# Patient Record
Sex: FEMALE | Race: WHITE
[De-identification: names, ages, dates, MRNs, and addresses within clinical notes are randomized per-mention and may not be internally consistent; named-entity substitution may affect disease eponyms.]

---

## 2017-01-12 NOTE — EMERGENCY ROOM VISIT NOTE
History


Report prepared by Josy:  Linsey De La Cruz


Under the Supervision of:  Dr. José Miguel Mack M.D.


First contact with patient:  01:39


Chief Complaint:  PALPITATIONS


Stated Complaint:  HEART PALPITATIONS





History of Present Illness


The patient is a 31 year old female who presents to the Emergency Room with 

complaints of intermittent tachycardia and palpitations that began this 

evening.  The patient states that she felt fine earlier today and states that 

this evening, after she took her evening medications, she was watching TV when 

she developed the palpitations and tachycardia.  She states that she has had 

this happen in the past, noting that the first time she was in SVT and the 

second time her potassium level was off.  The patient states that when she 

noticed the symptoms today she associates a sore throat and cough, which is 

typical.  She denies any chest pain, lymphadenopathy, or shortness of breath.  

The patient denies any history of thyroid problems or blood clots.  She denies 

any recent travel or recent surgery.  She states that she takes birth control 

daily.





   Source of History:  patient


   Onset:  this evening


   Position:  other (global)


   Quality:  other (tachycardia and palpitations)


   Timing:  intermittent


   Associated Symptoms:  + cough, + sorethroat, No SOB, No chest pain, No 

lymphadenopathy





Review of Systems


See HPI for pertinent positives & negatives. A total of 10 systems reviewed and 

were otherwise negative.





Past Medical & Surgical


Medical Problems:


(1) UTI (urinary tract infection)


Social History Problems:


(1) IUD (intrauterine device) in place








Family History





Patient reports no known family medical history.





Social History


Smoking Status:  Never Smoker


Alcohol Use:  occasionally


Drug Use:  none


Occupation Status:  employed





Current/Historical Medications


Scheduled


Medroxyprogesterone Acetate (Depo-Provera), 400 MG INJ [id number]


Metoprolol Tartrate (Lopressor) (Lopressor), 25 MG PO BID





Scheduled PRN


Cyclobenzaprine Hcl (Flexeril), 10 MG PO BID PRN for SPASMS


Fluticasone Propionate (Nasal) (Flonase Allergy Relief), 1 SPRAY SHANITA DAILY PRN 

for CONGESTION


Loratadine (Claritin), 10 MG PO DAILY PRN for ALLERGIC REACTION


Lorazepam (Ativan), 1 MG PO BID PRN for Anxiety


Tramadol (Ultram), 50 MG PO Q6H PRN for Pain





Allergies


Coded Allergies:  


     Cephalexin (Verified  Allergy, Unknown, hives, 2/22/15)


     Prednisone (Verified  Allergy, Unknown, SVT, 6/27/16)





Physical Exam


Vital Signs











  Date Time  Temp Pulse Resp B/P Pulse Ox O2 Delivery O2 Flow Rate FiO2


 


1/12/17 02:53  84 20 116/73 96   


 


1/12/17 02:06  81      


 


1/12/17 01:53  91 20 120/75 98 Room Air  


 


1/12/17 01:45      Room Air  


 


1/12/17 01:34 37.0 85 20 182/115 97 Room Air  











Physical Exam


GENERAL: Patient is well appearing and in no acute distress.


HEENT: No acute trauma, normocephalic atraumatic, mucous membranes moist, no 

nasal congestion, no scleral icterus.


NECK: No stridor, no adenopathy, no meningismus, trachea is midline.


LUNGS: No dyspnea. Clear to auscultation and equal bilaterally. No wheeze, no 

rhonchi.


HEART: Regular rate and rhythm.  No murmurs, rubs, gallops appreciated.


ABDOMEN: Soft, nontender, bowel sounds positive, no masses appreciated, no 

peritonitis.


BACK: No midline tenderness, no CVA tenderness


EXTREMITIES: Normal motion all extremities, no cyanosis, no edema.


NEUROLOGIC: Alert and oriented, no acute motor or sensory deficits, no focal 

weakness, cranial nerves grossly intact.


SKIN: No rash, no jaundice, no diaphoresis.





Medical Decision & Procedures


Laboratory Results


1/12/17 01:48








Red Blood Count 5.05, Mean Corpuscular Volume 82.6, Mean Corpuscular Hemoglobin 

29.5, Mean Corpuscular Hemoglobin Concent 35.7, Mean Platelet Volume 9.0, 

Neutrophils (%) (Auto) 52.9, Lymphocytes (%) (Auto) 35.3, Monocytes (%) (Auto) 

8.6, Eosinophils (%) (Auto) 2.4, Basophils (%) (Auto) 0.4, Neutrophils # (Auto) 

7.05, Lymphocytes # (Auto) 4.72, Monocytes # (Auto) 1.15, Eosinophils # (Auto) 

0.32, Basophils # (Auto) 0.06





1/12/17 01:48

















Test


  1/12/17


01:48


 


White Blood Count


  13.36 K/uL


(4.8-10.8)


 


Red Blood Count


  5.05 M/uL


(4.2-5.4)


 


Hemoglobin


  14.9 g/dL


(12.0-16.0)


 


Hematocrit 41.7 % (37-47) 


 


Mean Corpuscular Volume


  82.6 fL


()


 


Mean Corpuscular Hemoglobin


  29.5 pg


(25-34)


 


Mean Corpuscular Hemoglobin


Concent 35.7 g/dl


(32-36)


 


Platelet Count


  410 K/uL


(130-400)


 


Mean Platelet Volume


  9.0 fL


(7.4-10.4)


 


Neutrophils (%) (Auto) 52.9 % 


 


Lymphocytes (%) (Auto) 35.3 % 


 


Monocytes (%) (Auto) 8.6 % 


 


Eosinophils (%) (Auto) 2.4 % 


 


Basophils (%) (Auto) 0.4 % 


 


Neutrophils # (Auto)


  7.05 K/uL


(1.4-6.5)


 


Lymphocytes # (Auto)


  4.72 K/uL


(1.2-3.4)


 


Monocytes # (Auto)


  1.15 K/uL


(0.11-0.59)


 


Eosinophils # (Auto)


  0.32 K/uL


(0-0.5)


 


Basophils # (Auto)


  0.06 K/uL


(0-0.2)


 


RDW Standard Deviation


  40.7 fL


(36.4-46.3)


 


RDW Coefficient of Variation


  13.6 %


(11.5-14.5)


 


Immature Granulocyte % (Auto) 0.4 % 


 


Immature Granulocyte # (Auto)


  0.06 K/uL


(0.00-0.02)


 


Anion Gap


  12.0 mmol/L


(3-11)


 


Est Creatinine Clear Calc


Drug Dose 157.4 ml/min 


 


 


Estimated GFR (


American) 124.0 


 


 


Estimated GFR (Non-


American 107.0 


 


 


BUN/Creatinine Ratio 15.7 (10-20) 


 


Calcium Level


  8.9 mg/dl


(8.5-10.1)


 


Troponin I


  < 0.015 ng/ml


(0-0.045)


 


Thyroid Stimulating Hormone


(TSH) 3.420 uIu/ml


(0.300-4.500)








Laboratory results as reviewed by me.





ECG


Indication:  palpitations, tachycardia


Rate (beats per minute):  86


Rhythm:  normal sinus


Findings:  PVC, no acute ischemic change





ED Course


0140: The patient was evaluated in room B2. A complete history and physical 

exam was performed.





0237: I reevaluated the patient and she is feeling well with no further issues.





0247: I reevaluated the patient and she is doing well.  I discussed the exam 

findings with her and I discussed the treatment plan.  She verbalized complete 

understanding and agreement.  She is ready to go home.





Medical Decision


Differential: NSR, SVT, PACs, PVCs, Cardiac Dysrhythmia, Endocrine Dysfunction, 

Electrolyte/Metabolic Abnormality, Pulmonary Embolism, Infectious, GI, amongst 

other pathologies entertained.





31 yr old female with several year history of periodic palpitations, usually 

brought on by caffeine or stress.  Admits poor appetite with increased stress/

work over last 24 hours and then stated having palpitations this evening.  

Notes associated cough which is typical for when she has these palpitations.  

No PE risk factors and she is without any symptoms currently.  States symptoms 

similar to previous and thus I feel PE work-up not indicated at this time.  

Notes previously normal TSH/Mag when checked.  Follows regularly with cards.  

BP and HR good here.  We discussed she could take extra metoprolol in future if 

palpitations and no other symptoms.  She is comfortable with going home and in 

no distress.  Periodic PVCs on monitor noted without runs or issue.





Impression





 Primary Impression:  


 Intermittent palpitations





Scribe Attestation


The scribe's documentation has been prepared under my direction and personally 

reviewed by me in its entirety. I confirm that the note above accurately 

reflects all work, treatment, procedures, and medical decision making performed 

by me.





Departure Information


Dispostion


Home / Self-Care





Referrals


Donald Viveros M.D. (PCP)





Forms


HOME CARE DOCUMENTATION FORM,                                                 

               IMPORTANT VISIT INFORMATION





Patient Instructions


A Signature Page, ED Palpitations, My [facility]

## 2017-06-26 NOTE — EMERGENCY ROOM VISIT NOTE
History


First contact with patient:  04:00


Chief Complaint:  HEAD INJURY (MINOR)


Stated Complaint:  CONCUSSION X 1WK AGO





History of Present Illness


The patient is a 31 year old female who presents to the Emergency Room with 

complaints of head injury one week ago.  Patient states she stepped up and her 

jeep and hit her head on the door.  She complained of immediate headache with 

feeling lightheaded and dizzy.  Patient saw her family care for this was 

diagnosed with concussion.  Patient states this is her first night back to work 

and feels that this if she overdid it.  Patient states she did a lot of house 

cleaning today before coming into work.  She has not been really not much since 

the initial head injury.  She developed headache and vomited tonight after 

reading a lot of charts and documenting.  She currently complains of a headache 

and feels lightheaded.  5 out of 10 throughout the head.  Nothing makes it 

better or worse.  Patient with occasional dizziness with feeling off balance.  

Patient denies chest pain, dyspnea, loss of vision, blurry vision, neck pain, 

chest pain, abdominal pain, numbness, tingling.  She is tolerate by mouth 

fluids and food.





Review of Systems


See HPI for pertinent positives & negatives. A total of 10 systems reviewed and 

were otherwise negative.





Past Medical/Surgical History


Medical Problems:


(1) UTI (urinary tract infection)


Social History Problems:


(1) IUD (intrauterine device) in place








Family History





Patient reports no known family medical history.





Social History


Smoking Status:  Never Smoker


Alcohol Use:  occasionally


Drug Use:  none


Occupation Status:  employed





Current/Historical Medications


Scheduled


Medroxyprogesterone Acetate (Depo-Provera), 400 MG INJ [id number]


Metoprolol Tartrate (Lopressor) (Lopressor), 25 MG PO BID


Ondasetron Odt (Zofran Odt), 4 MG SL Q6H





Scheduled PRN


Cyclobenzaprine Hcl (Flexeril), 10 MG PO BID PRN for SPASMS


Fluticasone Propionate (Nasal) (Flonase Allergy Relief), 1 SPRAY SHANITA DAILY PRN 

for CONGESTION


Loratadine (Claritin), 10 MG PO DAILY PRN for ALLERGIC REACTION


Lorazepam (Ativan), 1 MG PO BID PRN for Anxiety


Tramadol (Ultram), 50 MG PO Q6H PRN for Pain





Allergies


Coded Allergies:  


     Cephalexin (Verified  Allergy, Unknown, hives, 2/22/15)


     Prednisone (Verified  Allergy, Unknown, SVT, 16)





Physical Exam


Vital Signs











  Date Time  Temp Pulse Resp B/P (MAP) Pulse Ox O2 Delivery O2 Flow Rate FiO2


 


17 04:01   18     


 


17 03:54 36.9 81 20 138/92 96 Room Air  











Physical Exam


VITALS: Vitals are noted on the nurse's note and reviewed by myself.  Vital 

signs stable.


GENERAL: Pleasant female, in no acute distress, nondiaphoretic, well-developed 

well-nourished.


SKIN: The skin was without rashes, erythema, edema, or bruising.  There is no 

tenting of the skin.  Capillary reflex less than 2 seconds.


HEAD: Normocephalic atraumatic.  


EARS: External auditory canals clear, tympanic membranes pearly gray without 

erythema or effusion bilaterally.


EYES: Pupils equal round and reactive to light and accommodation.  Conjunctivae 

without injection, sclerae without icterus.  Extraocular movements intact.  


NOSE: Patent, turbinates without inflammation or discharge.  No sinus 

tenderness.


MOUTH: Mucous membranes moist.   Pharynx without erythema or exudate.  Uvula 

midline.  Airway patent.  Tongue does not deviate.  


NECK: Supple without nuchal rigidity.  No lymphadenopathy.  No thyromegaly.  

Cervical spine is nontender.  No JVD.


HEART: Regular rate and rhythm without murmurs gallops or rubs.


LUNGS: Clear to auscultation bilaterally without wheezes, rales or rhonchi.  No 

dullness to percussion.  No retractions or accessory muscle use.


ABDOMEN: Positive bowel sounds x 4.  Normal tympanic percussion.  Soft, 

nontender, without masses or organomegaly.  Leavitt sign negative.  No guarding 

or rebound tenderness.


MUSCULOSKELETAL: No muscle atrophy, erythema, or edema noted.   


NEURO: Patient was alert and oriented to person place and time.  Normal 

sensation to light and sharp touch.  No focal neurological deficits.  Cranial 

nerves II through XII grossly intact.  No pronator drift.  Cerebellar exam 

intact





Medical Decision & Procedures


ED Course


Prior records/ancillary studies reviewed.


Triage Nursing notes reviewed.





The patient's history was concerning for traumatic head injury





Differential diagnosis:





Etiologies such as concussion, contusion, fracture, subdural hematoma, epidural 

hematoma, intraparenchymal hemorrhage, as well as other traumatic pathologies 

were entertained.





Physical examination findings:


As above.





ER treatment provided:


Zofran ODT


On reassessment the patient felt better.





Diagnostics interpreted by me:


Deferred 





It appears the patient has a concussion. I discussed the risks and the benefits 

of CT scanning. Clinically the patient is doing well and does not appear to 

have a significant underlying injury. The pt felt comfortable with conservative 

observation with the understanding if the clinical picture change that imaging 

may be necessary at a later time.  I gave my usual and customary discussion 

regarding this issue.  Patient was advised follow-up concussion clinic here in 

[facility].  She is advised to avoid activities that cause her headaches and to take 

a break from activity when she develops one.  She is advised to see her family 

care doctor in a few days and take Zofran and Tylenol as needed for her 

symptoms.  She is advised to return to the ER immediately for severe headache, 

fevers, weakness, worsening signs or symptoms or as needed.  Patient was 

neurovascularly and neurologically intact.  She is well-appearing.





By the evaluation outlined above emergent etiologies such as fracture, subdural 

hematoma, epidural hematoma, intraparenchymal hemorrhage, as well as others 

were deemed relatively unlikely.





The pt informed about the findings as listed above. All questions were answered 

and  pleased with the treatment. Return instructions were outlined and the 

patient was discharged in stable condition.





Outpatient Prescription Management:


zofran





Referral:


The patient was referred back to their primary care physician for follow-up in 

2 to 3 days for a recheck of the current condition.





Medical Decision


As above





Impression





 Primary Impression:  


 Closed head injury





Departure Information


Dispostion


Home / Self-Care





Condition


GOOD





Prescriptions





Ondasetron Odt (ZOFRAN ODT) 4 Mg Tab


4 MG SL Q6H, #10 TAB


   Prov: Jasmyn Siu .CEFERINO         17





Forms


HOME CARE DOCUMENTATION FORM,                                                 

               Work Instructions,    Return To Work:  3 days


      


   IMPORTANT VISIT INFORMATION





Patient Instructions


Concussion, My [facility]





Additional Instructions





Zofran 4 m tablet every 6 hours as needed for nausea and her vomiting.





Read head injury handout and return for any symptoms.  Tylenol 1000 mg as 

needed for pain (Maximum 3000 mg Tylenol in 24 hr period). Avoid alcohol and 

contact sports/activities for one week and follow up with family doctor prior 

to returning to these activities if still symptomatic. Ice and elevate head.





If your symptoms persist more than a week then follow up with the concussion 

clinic. Call [phone].





Return to ER sooner for headache, fevers, confusion, worsening signs or 

symptoms or as needed.





Follow-up with family care in 2-3 days for further evaluation.





Work Instructions


Return To Work:  3 days





Problem Qualifiers








 Primary Impression:  


 Closed head injury


 Encounter type:  initial encounter  Qualified Codes:  S09.90XA - Unspecified 

injury of head, initial encounter

## 2017-12-10 ENCOUNTER — HOSPITAL ENCOUNTER (EMERGENCY)
Dept: HOSPITAL 45 - C.EDB | Age: 32
Discharge: HOME | End: 2017-12-10
Payer: COMMERCIAL

## 2017-12-10 VITALS — DIASTOLIC BLOOD PRESSURE: 88 MMHG | OXYGEN SATURATION: 98 % | HEART RATE: 93 BPM | SYSTOLIC BLOOD PRESSURE: 131 MMHG

## 2017-12-10 VITALS
WEIGHT: 293 LBS | HEIGHT: 67.99 IN | HEIGHT: 67.99 IN | WEIGHT: 293 LBS | BODY MASS INDEX: 44.41 KG/M2 | BODY MASS INDEX: 44.41 KG/M2

## 2017-12-10 VITALS — TEMPERATURE: 98.42 F

## 2017-12-10 DIAGNOSIS — R05: ICD-10-CM

## 2017-12-10 DIAGNOSIS — J06.9: Primary | ICD-10-CM

## 2017-12-10 NOTE — EMERGENCY ROOM VISIT NOTE
History


Report prepared by Josy:  Omar Simpson


Under the Supervision of:  Dr. Gorge Fraga D.O.


First contact with patient:  08:40


Chief Complaint:  COUGH


Stated Complaint:  UPPER RESPIRATORY INFECTION





History of Present Illness


The patient is a 32 year old female who presents to the Emergency Room with 

complaints of a cough that began 1 month ago. She has a past medical history of 

SVT and was previously on Lopressor. She follows up with Cardiology and was 

recently switched from Lopressor to Cardizem. Over this time, she has been 

intermittently coughing up thick yellow sputum with a feeling of chest 

congestion. She is also has a sore throat. She denies any other abnormal 

symptoms.





   Source of History:  patient


   Onset:  1 month


   Position:  other (Respiratory System)


   Symptom Intensity:  moderate


   Quality:  other (Cough)


   Timing:  constant


   Associated Symptoms:  + sorethroat


Note:


She is having chest congestion. She denies any other abnormal symptoms.





Review of Systems


See HPI for pertinent positives & negatives. A total of 10 systems reviewed and 

were otherwise negative.





Past Medical & Surgical


Medical Problems:


(1) UTI (urinary tract infection)


Social History Problems:


(1) IUD (intrauterine device) in place








Family History





Patient reports no known family medical history.





Social History


Smoking Status:  Never Smoker


Alcohol Use:  occasionally


Drug Use:  none


Occupation Status:  employed





Current/Historical Medications


Scheduled


Diltiazem Hcl Coated Beads (Cartia Xt), 120 MG PO DAILY


Escitalopram Oxalate (Escitalopram Oxalate), 10 MG PO DAILY


Medroxyprogesterone Acetate (Depo-Provera), 400 MG INJ [id number]


Ondasetron Odt (Zofran Odt), 4 MG SL Q6H





Scheduled PRN


Cyclobenzaprine Hcl (Flexeril), 10 MG PO BID PRN for SPASMS


Fluticasone Propionate (Nasal) (Flonase Allergy Relief), 1 SPRAY SHANITA DAILY PRN 

for CONGESTION


Loratadine (Claritin), 10 MG PO DAILY PRN for ALLERGIC REACTION


Lorazepam (Ativan), 1 MG PO BID PRN for Anxiety


Tramadol (Ultram), 50 MG PO Q6H PRN for Pain





Allergies


Coded Allergies:  


     Cephalexin (Verified  Allergy, Unknown, hives, 12/10/17)


     Prednisone (Verified  Allergy, Unknown, SVT, 12/10/17)





Physical Exam


Vital Signs











  Date Time  Temp Pulse Resp B/P (MAP) Pulse Ox O2 Delivery O2 Flow Rate FiO2


 


12/10/17 09:14      Room Air  


 


12/10/17 08:35 36.9 99 18 129/91 96 Room Air  











Physical Exam


CONSTITUTIONAL/VITAL SIGNS: Reviewed / noted above.


GENERAL: Non-toxic in appearance. 


INTEGUMENTARY: Warm, dry, and Pink.


HEAD: Normocephalic.


EYES: without scleral icterus or trauma.


ENT/OROPHARYNX: Moist. There are numerous tiny posterior pharyngeal vesicles. 


LYMPHADENOPATHY/NECK: Is supple without lymphadenopathy or meningismus.


RESPIRATORY: Lungs clear and equal.


CARDIOVASCULAR: Regular rate and rhythm.


GI/ABDOMEN: Soft and nontender. No organomegaly or pulsatile mass. No rebound 

or guarding. Normal bowel sounds.


EXTREMITIES: Warm and well perfused.


BACK: No CVA tenderness.


NEUROLOGICAL: Intact without focal deficits. 


PSYCHIATRIC: normal affect.


MUSCULOSKELETAL: Normally developed with good muscle tone.





Medical Decision & Procedures


ER Provider


Diagnostic Interpretation:


Radiology results as stated below per my review and radiologist interpretation:





CHEST ONE VIEW PORTABLE





HISTORY:  32 years-old Female cough acute cough





COMPARISON: Chest radiograph 2/23/2015





TECHNIQUE: Portable AP view of the chest





FINDINGS: 


Cardiomediastinal and hilar silhouettes are within normal limits. No


pneumothorax, pleural effusion, focal airspace consolidation or overt pulmonary


edema. Bones of the chest are grossly intact.





IMPRESSION: No acute cardiopulmonary process. 





The above report was generated using voice recognition software. It may contain


grammatical, syntax or spelling errors.





Electronically signed by:  Yong Castaneda M.D.


12/10/2017 9:20 AM





Dictated Date/Time:  12/10/2017 9:15 AM





Medications Administered











 Medications


  (Trade)  Dose


 Ordered  Sig/Susan


 Route  Start Time


 Stop Time Status Last Admin


Dose Admin


 


 Albuterol/


 Ipratropium


  (Duoneb)  3 ml  NOW  STAT


 INH  12/10/17 08:51


 12/10/17 08:57 DC 12/10/17 09:11


3 ML


 


 Dexamethasone


 Sodium Phosphate


  (Dexamethasone


 Inj **Pf**)  10 mg  STK-MED ONCE


 .ROUTE  12/10/17 09:09


 12/10/17 09:10 DC 12/10/17 09:13


8 MG











ED Course


0840: Previous medical records were reviewed. The patient was evaluated in room 

B2. A complete history and physical examination was performed.





0851: Ordered Decadron Inj 8 mg IM, DuoNeb 3 ml INH





0909: Ordered Dexamethasone Sodium Phosphate 10 mg .ROUTE





1000: On reevaluation, the patient is resting. I discussed the results and 

findings with the patient. She verbalized agreement of the treatment plan. She 

was discharged home.





Medical Decision


Differentials considered include acute myocardial infarction, acute coronary 

syndrome, myocarditis, pericarditis, pericardial effusions /tamponade, 

esophageal perforation, pulmonary embolism, pneumonia, pneumothorax, 

cardiomyopathy, congestive heart, anemia, and COPD/asthma exacerbation.





This is a 32-year-old female who presents to the ED with a chief complaint of a 

cough she reports that her symptoms have increased over the last couple of 

days.  She states that she has an occasional productive yellow sputum.  She 

also complains of a sore throat.  Her vital signs are normal.  She is afebrile.

  Physical exam reveals some posterior oropharyngeal vesicles.  She is no 

lymphadenopathy.  Her lungs were mostly clear.  She was treated with a DuoNeb 

treatment as well as some IM Decadron.  Her symptoms are felt to be viral.  A 

chest x-ray did not show acute process.  She is felt to be stable for discharge.





Medication Reconcilliation


Current Medication List:  was personally reviewed by me





Blood Pressure Screening


Patient's blood pressure:  Normal blood pressure


Blood pressure disposition:  Did not require urgent referral





Impression





 Primary Impression:  


 Viral URI with cough





Scribe Attestation


The scribe's documentation has been prepared under my direction and personally 

reviewed by me in its entirety. I confirm that the note above accurately 

reflects all work, treatment, procedures, and medical decision making performed 

by me.





Departure Information


Dispostion


Home / Self-Care





Referrals


Donald Viveros M.D. (PCP)





Forms


HOME CARE DOCUMENTATION FORM,                                                 

               IMPORTANT VISIT INFORMATION





Patient Instructions


My [facility]





Additional Instructions





Follow-up with your PCP in 4-7 days if symptoms persist.





Anticipate some improvement of the symptoms over the next week.





Take Tylenol or Motrin as needed for discomfort.





Use throat lozenges or sore throat sprays for sore throat.

## 2018-02-21 ENCOUNTER — HOSPITAL ENCOUNTER (EMERGENCY)
Dept: HOSPITAL 45 - C.EDB | Age: 33
Discharge: HOME | End: 2018-02-21
Payer: COMMERCIAL

## 2018-02-21 VITALS
WEIGHT: 293 LBS | HEIGHT: 67.99 IN | BODY MASS INDEX: 44.41 KG/M2 | HEIGHT: 67.99 IN | WEIGHT: 293 LBS | BODY MASS INDEX: 44.41 KG/M2

## 2018-02-21 VITALS — TEMPERATURE: 98.6 F

## 2018-02-21 VITALS — OXYGEN SATURATION: 95 % | SYSTOLIC BLOOD PRESSURE: 109 MMHG | HEART RATE: 108 BPM | DIASTOLIC BLOOD PRESSURE: 77 MMHG

## 2018-02-21 VITALS — OXYGEN SATURATION: 94 %

## 2018-02-21 DIAGNOSIS — Z87.440: ICD-10-CM

## 2018-02-21 DIAGNOSIS — F39: Primary | ICD-10-CM

## 2018-02-21 DIAGNOSIS — Z79.899: ICD-10-CM

## 2018-02-21 LAB
ALBUMIN SERPL-MCNC: 3.8 GM/DL (ref 3.4–5)
ALP SERPL-CCNC: 98 U/L (ref 45–117)
ALT SERPL-CCNC: 24 U/L (ref 12–78)
AST SERPL-CCNC: 13 U/L (ref 15–37)
BASOPHILS # BLD: 0.07 K/UL (ref 0–0.2)
BASOPHILS NFR BLD: 0.6 %
BUN SERPL-MCNC: 10 MG/DL (ref 7–18)
CALCIUM SERPL-MCNC: 9 MG/DL (ref 8.5–10.1)
CO2 SERPL-SCNC: 22 MMOL/L (ref 21–32)
CREAT SERPL-MCNC: 0.84 MG/DL (ref 0.6–1.2)
EOS ABS #: 0.14 K/UL (ref 0–0.5)
EOSINOPHIL NFR BLD AUTO: 395 K/UL (ref 130–400)
GLUCOSE SERPL-MCNC: 89 MG/DL (ref 70–99)
HCT VFR BLD CALC: 42.6 % (ref 37–47)
HGB BLD-MCNC: 15.2 G/DL (ref 12–16)
IG#: 0.05 K/UL (ref 0–0.02)
IMM GRANULOCYTES NFR BLD AUTO: 33.9 %
INR PPP: 1 (ref 0.9–1.1)
LIPASE: 151 U/L (ref 73–393)
LYMPHOCYTES # BLD: 3.85 K/UL (ref 1.2–3.4)
MCH RBC QN AUTO: 29.2 PG (ref 25–34)
MCHC RBC AUTO-ENTMCNC: 35.7 G/DL (ref 32–36)
MCV RBC AUTO: 81.8 FL (ref 80–100)
MONO ABS #: 0.79 K/UL (ref 0.11–0.59)
MONOCYTES NFR BLD: 7 %
NEUT ABS #: 6.45 K/UL (ref 1.4–6.5)
NEUTROPHILS # BLD AUTO: 1.2 %
NEUTROPHILS NFR BLD AUTO: 56.9 %
PMV BLD AUTO: 8.6 FL (ref 7.4–10.4)
POTASSIUM SERPL-SCNC: 3.5 MMOL/L (ref 3.5–5.1)
PROT SERPL-MCNC: 8.5 GM/DL (ref 6.4–8.2)
PTT PATIENT: 27.5 SECONDS (ref 21–31)
RED CELL DISTRIBUTION WIDTH CV: 13.7 % (ref 11.5–14.5)
RED CELL DISTRIBUTION WIDTH SD: 41.1 FL (ref 36.4–46.3)
SODIUM SERPL-SCNC: 140 MMOL/L (ref 136–145)
WBC # BLD AUTO: 11.35 K/UL (ref 4.8–10.8)

## 2018-05-09 ENCOUNTER — HOSPITAL ENCOUNTER (EMERGENCY)
Dept: HOSPITAL 45 - C.EDB | Age: 33
Discharge: HOME | End: 2018-05-09
Payer: COMMERCIAL

## 2018-05-09 VITALS — SYSTOLIC BLOOD PRESSURE: 114 MMHG | DIASTOLIC BLOOD PRESSURE: 76 MMHG

## 2018-05-09 VITALS — TEMPERATURE: 98.42 F

## 2018-05-09 VITALS — OXYGEN SATURATION: 95 % | HEART RATE: 89 BPM

## 2018-05-09 VITALS
BODY MASS INDEX: 44.41 KG/M2 | HEIGHT: 67.99 IN | HEIGHT: 67.99 IN | BODY MASS INDEX: 44.41 KG/M2 | WEIGHT: 293 LBS | WEIGHT: 293 LBS

## 2018-05-09 DIAGNOSIS — Z97.5: ICD-10-CM

## 2018-05-09 DIAGNOSIS — Z79.899: ICD-10-CM

## 2018-05-09 DIAGNOSIS — R09.81: ICD-10-CM

## 2018-05-09 DIAGNOSIS — E86.0: ICD-10-CM

## 2018-05-09 DIAGNOSIS — J02.9: Primary | ICD-10-CM

## 2018-05-09 DIAGNOSIS — Z88.8: ICD-10-CM

## 2018-05-09 DIAGNOSIS — Z87.440: ICD-10-CM

## 2018-05-09 DIAGNOSIS — Z88.1: ICD-10-CM

## 2018-05-09 LAB
BASOPHILS # BLD: 0.05 K/UL (ref 0–0.2)
BASOPHILS NFR BLD: 0.5 %
BUN SERPL-MCNC: 11 MG/DL (ref 7–18)
CALCIUM SERPL-MCNC: 8.6 MG/DL (ref 8.5–10.1)
CO2 SERPL-SCNC: 24 MMOL/L (ref 21–32)
CREAT SERPL-MCNC: 0.88 MG/DL (ref 0.6–1.2)
EOS ABS #: 0.35 K/UL (ref 0–0.5)
EOSINOPHIL NFR BLD AUTO: 350 K/UL (ref 130–400)
GLUCOSE SERPL-MCNC: 91 MG/DL (ref 70–99)
HCT VFR BLD CALC: 42 % (ref 37–47)
HGB BLD-MCNC: 14.3 G/DL (ref 12–16)
IG#: 0.07 K/UL (ref 0–0.02)
IMM GRANULOCYTES NFR BLD AUTO: 40.5 %
LYMPHOCYTES # BLD: 3.9 K/UL (ref 1.2–3.4)
MCH RBC QN AUTO: 28.4 PG (ref 25–34)
MCHC RBC AUTO-ENTMCNC: 34 G/DL (ref 32–36)
MCV RBC AUTO: 83.5 FL (ref 80–100)
MONO ABS #: 0.76 K/UL (ref 0.11–0.59)
MONOCYTES NFR BLD: 7.9 %
MONOSPOT: (no result)
NEUT ABS #: 4.51 K/UL (ref 1.4–6.5)
NEUTROPHILS # BLD AUTO: 3.6 %
NEUTROPHILS NFR BLD AUTO: 46.8 %
PMV BLD AUTO: 8.6 FL (ref 7.4–10.4)
POTASSIUM SERPL-SCNC: 3.5 MMOL/L (ref 3.5–5.1)
RED CELL DISTRIBUTION WIDTH CV: 14.6 % (ref 11.5–14.5)
RED CELL DISTRIBUTION WIDTH SD: 44.4 FL (ref 36.4–46.3)
SODIUM SERPL-SCNC: 140 MMOL/L (ref 136–145)
WBC # BLD AUTO: 9.64 K/UL (ref 4.8–10.8)

## 2018-05-09 NOTE — DIAGNOSTIC IMAGING REPORT
CHEST ONE VIEW PORTABLE



CLINICAL HISTORY: Wheeze dyspnea



COMPARISON STUDY:  12/10/2017



FINDINGS: The bones soft tissues and hemidiaphragms are normal. The

cardiomediastinal silhouette is normal. The lungs are clear. The pulmonary

vasculature is normal. 



IMPRESSION:  Negative chest. 











The above report was generated using voice recognition software.  It may contain

grammatical, syntax or spelling errors.









Electronically signed by:  José Miguel Jacobs M.D.

5/9/2018 6:34 AM



Dictated Date/Time:  5/9/2018 6:34 AM

## 2018-05-09 NOTE — EMERGENCY ROOM VISIT NOTE
History


Report prepared by Josy:  Tracy Navarro


Under the Supervision of:  Dr. José Miguel Mack M.D.


First contact with patient:  02:10


Chief Complaint:  SORETHROAT


Stated Complaint:  SORE THROAT, DIZZY, COUGH





History of Present Illness


The patient is a 32 year old female who presents to the Emergency Room with 

complaints of a constant sore throat starting a month ago. The patient states 

that she thinks she has mono. She reports that she saw her PCP yesterday who 

thought the same thing and started her on a Z-pack. She states her PCP also 

noticed fluid in her ears. She reports that she was not started on a steroid, 

but thought maybe she should be. The patient complains of fatigue, nasal 

congestion, loss of appetite, fevers, a rash of little red bumps, loose stools 

since starting the Z-Pack, and feeling like her breathing is off. She notes 

that she has used Flonase, Rodger-vent, and Albuterol with no relief. She notes 

that she lives in an apartment with mold and that she is being followed for her 

breathing problem. The patient denies exposure to someone with mono, a history 

of asthma, abdominal pain, recent tick bites, ever having Lyme, urinary symptoms

, and the chance of pregnancy. The patient notes that she recently was on a 

cruise.





   Source of History:  patient


   Onset:  a month ago


   Position:  throat


   Quality:  other (sore)


   Timing:  constant


   Associated Symptoms:  + fevers, + diarrhea, + fatigue, + rash, No abdominal 

pain, No urinary symptoms


Note:


The patient complains of nasal congestion, loss of appetite, and feeling like 

her breathing is off. The patient denies recent tick bites.





Review of Systems


See HPI for pertinent positives & negatives. A total of 10 systems reviewed and 

were otherwise negative.





Past Medical & Surgical


Medical Problems:


(1) UTI (urinary tract infection)


Social History Problems:


(1) IUD (intrauterine device) in place








Family History





Patient reports no known family medical history.





Social History


Smoking Status:  Never Smoker


Alcohol Use:  occasionally


Drug Use:  none


Marital Status:  single


Housing Status:  lives alone


Occupation Status:  employed





Current/Historical Medications


Scheduled


Aripiprazole (Abilify), 5 MG PO DAILY


Diltiazem Hcl Coated Beads (Cartia Xt), 240 MG PO DAILY


Escitalopram Oxalate (Lexapro), 20 MG PO DAILY


Fluticasone Propionate (Flovent Hfa), 2 PUFFS INH BID


Medroxyprogesterone Acetate (C (Medroxyprogesterone Aceta), 150 MG IM UD


Methylprednisolone (Medrol Dosepak), 1 PKT PO UD


Montelukast Sod (Montelukast Sodium), 10 MG PO DAILY


Naltrexone HCl-Bupropion HCl (Contrave 8-90 mg), 1 TAB PO BID


Topiramate (Topamax ), 25 MG PO BID





Scheduled PRN


Albuterol Hfa (Ventolin Hfa), 2 PUFFS INH Q4H PRN for SOB/Wheezing


Fluticasone Propionate (Nasal) (Flonase Allergy Relief), 2 SPRAYS SHANITA DAILY PRN 

for Nasal Congestion


Loratadine (Claritin), 10 MG PO DAILY PRN for Allergy Symptoms


Lorazepam (Ativan), 0.5 MG PO DAILY PRN for Prior to Dental Appointment





Allergies


Coded Allergies:  


     Cephalexin (Verified  Allergy, Unknown, hives, 5/9/18)


     Prednisone (Verified  Allergy, Unknown, SVT, 5/9/18)





Physical Exam


Vital Signs











  Date Time  Temp Pulse Resp B/P (MAP) Pulse Ox O2 Delivery O2 Flow Rate FiO2


 


5/9/18 04:11  89 20  95   


 


5/9/18 04:00    114/76    


 


5/9/18 03:41  86 18  96   


 


5/9/18 03:30    111/79    


 


5/9/18 03:11  92 18  97 Room Air  


 


5/9/18 03:00    125/78    


 


5/9/18 02:50  94      


 


5/9/18 02:41  93 19     


 


5/9/18 02:36    118/82    


 


5/9/18 02:06 36.9 91 20 140/97 96 Room Air  











Physical Exam


GENERAL: Patient is anxious appearing and in mild distress.


EYES: No scleral icterus, unremarkable pupils.


ENT: Mucous membranes are dry, no nasal congestion. Ear- bilateral effusions.


NECK: No masses appreciated, no meningismus, trachea is midline.


RESPIRATORY: No dyspnea. Equal bilaterally. Mild expiratory wheeze. No rhonchi.


CARDIOVASCULAR: Regular rate and rhythm.  No murmurs, rubs, gallops appreciated.


GASTROINTESTINAL: Abdomen soft, nontender, no peritonitis.  Bowel sounds 

positive.  No masses appreciated.


BACK: No midline tenderness, no CVA tenderness


EXTREMITIES: Normal motion all extremities, no cyanosis, no edema.


NEUROLOGIC: Alert and oriented, no acute motor or sensory deficits, no focal 

weakness, cranial nerves grossly intact.


SKIN: Mild folliculitis on trunk.  No jaundice, no diaphoresis. Multiple areas 

of sunburn.





Medical Decision & Procedures


ER Provider


Diagnostic Interpretation:


X ray results are stated below per my interpretation:


Chest: 1 view: No infiltrate, no effusion, normal cardiac border. Similar to 

chest x-ray done in December 2017.





Laboratory Results


5/9/18 02:25








Red Blood Count 5.03, Mean Corpuscular Volume 83.5, Mean Corpuscular Hemoglobin 

28.4, Mean Corpuscular Hemoglobin Concent 34.0, Mean Platelet Volume 8.6, 

Neutrophils (%) (Auto) 46.8, Lymphocytes (%) (Auto) 40.5, Monocytes (%) (Auto) 

7.9, Eosinophils (%) (Auto) 3.6, Basophils (%) (Auto) 0.5, Neutrophils # (Auto) 

4.51, Lymphocytes # (Auto) 3.90, Monocytes # (Auto) 0.76, Eosinophils # (Auto) 

0.35, Basophils # (Auto) 0.05





5/9/18 02:25

















Test


  5/9/18


02:25


 


White Blood Count


  9.64 K/uL


(4.8-10.8)


 


Red Blood Count


  5.03 M/uL


(4.2-5.4)


 


Hemoglobin


  14.3 g/dL


(12.0-16.0)


 


Hematocrit 42.0 % (37-47) 


 


Mean Corpuscular Volume


  83.5 fL


()


 


Mean Corpuscular Hemoglobin


  28.4 pg


(25-34)


 


Mean Corpuscular Hemoglobin


Concent 34.0 g/dl


(32-36)


 


Platelet Count


  350 K/uL


(130-400)


 


Mean Platelet Volume


  8.6 fL


(7.4-10.4)


 


Neutrophils (%) (Auto) 46.8 % 


 


Lymphocytes (%) (Auto) 40.5 % 


 


Monocytes (%) (Auto) 7.9 % 


 


Eosinophils (%) (Auto) 3.6 % 


 


Basophils (%) (Auto) 0.5 % 


 


Neutrophils # (Auto)


  4.51 K/uL


(1.4-6.5)


 


Lymphocytes # (Auto)


  3.90 K/uL


(1.2-3.4)


 


Monocytes # (Auto)


  0.76 K/uL


(0.11-0.59)


 


Eosinophils # (Auto)


  0.35 K/uL


(0-0.5)


 


Basophils # (Auto)


  0.05 K/uL


(0-0.2)


 


RDW Standard Deviation


  44.4 fL


(36.4-46.3)


 


RDW Coefficient of Variation


  14.6 %


(11.5-14.5)


 


Immature Granulocyte % (Auto) 0.7 % 


 


Immature Granulocyte # (Auto)


  0.07 K/uL


(0.00-0.02)


 


Anion Gap


  7.0 mmol/L


(3-11)


 


Est Creatinine Clear Calc


Drug Dose 135.9 ml/min 


 


 


Estimated GFR (


American) 101.5 


 


 


Estimated GFR (Non-


American 87.6 


 


 


BUN/Creatinine Ratio 12.7 (10-20) 


 


Calcium Level


  8.6 mg/dl


(8.5-10.1)


 


Total Creatine Kinase


  185 U/L


()


 


Lyme Disease IgG Antibody NEG (NEG) 


 


Lyme Disease IgM Antibody NEG (NEG) 


 


Monoscreen NEG (NEG) 





Laboratory results as reviewed by me.





Medications Administered











 Medications


  (Trade)  Dose


 Ordered  Sig/Susan


 Route  Start Time


 Stop Time Status Last Admin


Dose Admin


 


 Dexamethasone


 Sodium Phosphate


  (Dexamethasone


 Inj **Pf**)  10 mg  NOW  ONCE


 IV  5/9/18 02:30


 5/9/18 02:31 DC 5/9/18 02:39


10 MG


 


 Albuterol/


 Ipratropium


  (Duoneb)  3 ml  NOW  STAT


 INH  5/9/18 02:20


 5/9/18 02:21 DC 5/9/18 02:36


3 ML


 


 Sodium Chloride  1,000 ml @ 


 999 mls/hr  Q1H1M STAT


 IV  5/9/18 02:20


 5/9/18 03:20 DC 5/9/18 02:35


999 MLS/HR


 


 Lidocaine HCl


  (Viscous


 Lidocaine 2% Soln)  10 ml  NOW  STAT


 MT  5/9/18 03:39


 5/9/18 03:40 DC 5/9/18 04:18


10 ML


 


 Ketorolac


 Tromethamine


  (Toradol Inj)  30 mg  NOW  STAT


 IV  5/9/18 03:39


 5/9/18 03:40 DC 5/9/18 04:19


30 MG











ED Course


0213: The patient was evaluated in room B10. A complete history and physical 

exam was performed.





0220: Ordered NSS 1000 ml @ 999 mls/hr IV, Duoneb 3 ml INH.





0230: Ordered Dexamethasone Sodium Phosphate 10 mg IV.





0327: I reevaluated the patient and she has resolution of the wheezing. She 

denies significant change in symptoms.





0339: Ordered Toradol Inj 30 mg IV, Lidocaine HCl 10 ml MT.





0341: Reevaluated the patient. Discussed results and discharge instructions: 

She verbalized understanding and agreement. She agrees to the Medrol dose pack 

and to follow up with her PCP. She notes that she has an inhaler at home 

already.  The patient is ready for discharge.





Medical Decision


Differential: Viral, Tonsillitis, Strep, Mono, Peritonsillar Abscess, 

Retropharyngeal Abscess, Otitis, Pneumonia, Influenza, amongst other 

pathologies entertained.





32 yr old female arrives for evaluation of sore throat, ear congestion, sinus 

congestion, fatigue, cough, diarrhea, weakness.  Effusions bilateral ears and 

mild expiratory wheeze on exam.  Given fluids and steroids along with 

nebulizer.  Wheeze gone but pt denies change in symptoms.  Given Lidocaine/

Toradol for sore throat.  Suggested continuing Zpack though I am not convinced 

there is bacterial infection.  Likely will improve better with steroids and 

thus given medrol pack which she has tolerated previously.  Mono and lyme are 

negative.  Labs unremarkable.  CXR clear.  Follow up with PCP.  RTED if 

worsening or other concerns.  No evidence nor history to suggest PE.  Symptoms 

not compatible with abscess.  No OM on ear examination.





Medication Reconcilliation


Current Medication List:  was personally reviewed by me





Blood Pressure Screening


Patient's blood pressure:  Normal blood pressure


Blood pressure disposition:  Did not require urgent referral





Impression





 Primary Impression:  


 Sore throat


 Additional Impressions:  


 Dehydration


 Sinus congestion


 Acute effusion of both middle ears





Scribe Attestation


The scribe's documentation has been prepared under my direction and personally 

reviewed by me in its entirety. I confirm that the note above accurately 

reflects all work, treatment, procedures, and medical decision making performed 

by me.





Departure Information


Dispostion


Home / Self-Care





Prescriptions





Methylprednisolone (MEDROL DOSEPAK) 4 Mg Santos


1 PKT PO UD for 6 Days, #1 PKT


   Prov: José Miguel Mack M.D.         5/9/18





Referrals


Donald Viveros M.D. (PCP)





Forms


HOME CARE DOCUMENTATION FORM,                                                 

               IMPORTANT VISIT INFORMATION





Patient Instructions


My [employer]





Additional Instructions





Keep well hydrated.


Continue to use inhalers as necessary.


If no improvement in symptoms please follow up with primary provider for 

further work-up and evaluation.


Return if fevers, worsening pain, difficulty breathing or other concerns.





Problem Qualifiers

## 2018-05-10 LAB — EBV EA IGG SER-ACNC: < 9 U/ML

## 2018-05-24 ENCOUNTER — HOSPITAL ENCOUNTER (EMERGENCY)
Dept: HOSPITAL 45 - C.EDB | Age: 33
Discharge: HOME | End: 2018-05-24
Payer: COMMERCIAL

## 2018-05-24 VITALS
HEIGHT: 67.99 IN | BODY MASS INDEX: 44.41 KG/M2 | WEIGHT: 293 LBS | HEIGHT: 67.99 IN | WEIGHT: 293 LBS | BODY MASS INDEX: 44.41 KG/M2

## 2018-05-24 VITALS — SYSTOLIC BLOOD PRESSURE: 141 MMHG | HEART RATE: 105 BPM | DIASTOLIC BLOOD PRESSURE: 94 MMHG | OXYGEN SATURATION: 96 %

## 2018-05-24 VITALS — TEMPERATURE: 98.06 F

## 2018-05-24 DIAGNOSIS — Z88.1: ICD-10-CM

## 2018-05-24 DIAGNOSIS — Z87.39: ICD-10-CM

## 2018-05-24 DIAGNOSIS — M54.5: Primary | ICD-10-CM

## 2018-05-24 DIAGNOSIS — Z88.8: ICD-10-CM

## 2020-03-11 NOTE — EMERGENCY ROOM VISIT NOTE
History


Report prepared by Josy:  Pablo Hoang


Under the Supervision of:  Dr. Gorge Vides M.D.


First contact with patient:  17:09


Chief Complaint:  OVERDOSE (INTENTIONAL)


Stated Complaint:  TOOK PILLS AND ALCOHOL





History of Present Illness


The patient is a 32 year old female who presents to the Emergency Room with 

complaints of a persistent intentional overdose occurring earlier today. The 

patient's mother states that the patient took her regular prescribed 

medications at a normal dose, and her  called the police for the 

patient. The patient states that she was taking this medication to get some 

sleep since she has been crying all last night and today, and she states that 

she was not trying to kill herself. The patient states that she is currently 

having marital problems with her . The patient's mother notes that when 

the police came, the patient was able to walk and talk normally, and the 

patient did not want to come to the hospital. The mother notes that before the 

patient came in she tried to grab a bottle of liquor from the cabinet, but the 

mother was able to dump it out. The patient denies any suicidal or homicidal 

ideations. The patient states that she had 2-3 drinks of alcohol today. She 

states that there is no chance that she is pregnant, and her last normal 

menstrual period was the 16th. The patient states that she takes Ultram for her 

herniated disc, and she took ibuprofen and Benadryl.





   Source of History:  patient


   Onset:  earlier today


   Position:  other (global)


   Quality:  other (overdose)


   Timing:  other (persistent)


Note:


Denies suicidal and homicidal ideations.





Review of Systems


See HPI for pertinent positives & negatives. A total of 10 systems reviewed and 

were otherwise negative.





Past Medical & Surgical


Medical Problems:


(1) UTI (urinary tract infection)


Social History Problems:


(1) IUD (intrauterine device) in place








Family History





Patient reports no known family medical history.





Social History


Smoking Status:  Never Smoker


Alcohol Use:  occasionally


Drug Use:  none


Occupation Status:  employed





Current/Historical Medications


Scheduled


Diltiazem Hcl Coated Beads (Cartia Xt), 120 MG PO DAILY


Escitalopram Oxalate (Escitalopram Oxalate), 10 MG PO DAILY


Fluticasone Propionate (Flovent Hfa), 2 PUFFS INH BID


Medroxyprogesterone Acetate (C (Medroxyprogesterone Aceta), 150 MG IM UD


Montelukast Sod (Montelukast Sodium), 10 MG PO DAILY


Naltrexone HCl-Bupropion HCl (Contrave 8-90 mg), 1 TAB PO BID





Scheduled PRN


Albuterol Hfa (Ventolin Hfa), 2 PUFFS INH Q4H PRN for SOB/Wheezing


Cyclobenzaprine Hcl (Flexeril), 10 MG PO BID PRN for Muscle Spasm


Fluticasone Propionate (Nasal) (Flonase Allergy Relief), 2 SPRAYS SHANITA DAILY PRN 

for Nasal Congestion


Loratadine (Claritin), 10 MG PO DAILY PRN for Allergy Symptoms


Lorazepam (Ativan), 1 MG PO DAILY PRN for Prior to Dental Appointment


Tramadol HCl (Tramadol HCl), 50 MG PO Q6H PRN for Pain


Zolpidem Tartrate (Zolpidem Tartrate), 10 MG PO HS PRN for Sleep





Allergies


Coded Allergies:  


     Cephalexin (Verified  Allergy, Unknown, hives, 12/10/17)


     Prednisone (Verified  Allergy, Unknown, SVT, 12/10/17)





Physical Exam


Vital Signs











  Date Time  Temp Pulse Resp B/P (MAP) Pulse Ox O2 Delivery O2 Flow Rate FiO2


 


2/21/18 20:07  108 18 109/77 95   


 


2/21/18 18:40  114 22 120/70 93 Room Air  


 


2/21/18 18:06  107      


 


2/21/18 17:37     94 Room Air  


 


2/21/18 17:07 37.0 118 18 112/71 94 Room Air  











Physical Exam


GENERAL: Patient is a healthy-appearing well-nourished female


HEAD: Normocephalic atraumatic


EYES: Ocular movements intact pupils equal and react to light


OROPHARYNX mucous membranes are moist no exudates present no erythema or edema 

present


NECK: Supple no nuchal rigidity


CHEST: Good equal expansion


LUNGS: Clear and equal to auscultation


CARDIAC: Normal S1 and S2


ABDOMEN: Soft nontender no guarding


BACK: No CVA tenderness


EXTREMITIES: No pain upon palpation normal muscle strength in all groups no 

clubbing cyanosis or edema


NEURO: Patient is following commands and answering questions appropriately. 

Alert and oriented x3 Cranial Nerves 2-12 grossly intact





Medical Decision & Procedures


Laboratory Results


2/21/18 17:31








Red Blood Count 5.21, Mean Corpuscular Volume 81.8, Mean Corpuscular Hemoglobin 

29.2, Mean Corpuscular Hemoglobin Concent 35.7, Mean Platelet Volume 8.6, 

Neutrophils (%) (Auto) 56.9, Lymphocytes (%) (Auto) 33.9, Monocytes (%) (Auto) 

7.0, Eosinophils (%) (Auto) 1.2, Basophils (%) (Auto) 0.6, Neutrophils # (Auto) 

6.45, Lymphocytes # (Auto) 3.85, Monocytes # (Auto) 0.79, Eosinophils # (Auto) 

0.14, Basophils # (Auto) 0.07





2/21/18 17:31

















Test


  2/21/18


17:31 2/21/18


17:34 2/21/18


17:55


 


White Blood Count


  11.35 K/uL


(4.8-10.8) 


  


 


 


Red Blood Count


  5.21 M/uL


(4.2-5.4) 


  


 


 


Hemoglobin


  15.2 g/dL


(12.0-16.0) 


  


 


 


Hematocrit 42.6 % (37-47)   


 


Mean Corpuscular Volume


  81.8 fL


() 


  


 


 


Mean Corpuscular Hemoglobin


  29.2 pg


(25-34) 


  


 


 


Mean Corpuscular Hemoglobin


Concent 35.7 g/dl


(32-36) 


  


 


 


Platelet Count


  395 K/uL


(130-400) 


  


 


 


Mean Platelet Volume


  8.6 fL


(7.4-10.4) 


  


 


 


Neutrophils (%) (Auto) 56.9 %   


 


Lymphocytes (%) (Auto) 33.9 %   


 


Monocytes (%) (Auto) 7.0 %   


 


Eosinophils (%) (Auto) 1.2 %   


 


Basophils (%) (Auto) 0.6 %   


 


Neutrophils # (Auto)


  6.45 K/uL


(1.4-6.5) 


  


 


 


Lymphocytes # (Auto)


  3.85 K/uL


(1.2-3.4) 


  


 


 


Monocytes # (Auto)


  0.79 K/uL


(0.11-0.59) 


  


 


 


Eosinophils # (Auto)


  0.14 K/uL


(0-0.5) 


  


 


 


Basophils # (Auto)


  0.07 K/uL


(0-0.2) 


  


 


 


RDW Standard Deviation


  41.1 fL


(36.4-46.3) 


  


 


 


RDW Coefficient of Variation


  13.7 %


(11.5-14.5) 


  


 


 


Immature Granulocyte % (Auto) 0.4 %   


 


Immature Granulocyte # (Auto)


  0.05 K/uL


(0.00-0.02) 


  


 


 


Prothrombin Time


  10.2 SECONDS


(9.0-12.0) 


  


 


 


Prothromb Time International


Ratio 1.0 (0.9-1.1) 


  


  


 


 


Activated Partial


Thromboplast Time 27.5 SECONDS


(21.0-31.0) 


  


 


 


Partial Thromboplastin Ratio 1.1   


 


Anion Gap


  8.0 mmol/L


(3-11) 


  


 


 


Est Creatinine Clear Calc


Drug Dose 141.8 ml/min 


  


  


 


 


Estimated GFR (


American) 107.3 


  


  


 


 


Estimated GFR (Non-


American 92.6 


  


  


 


 


BUN/Creatinine Ratio 11.3 (10-20)   


 


Calcium Level


  9.0 mg/dl


(8.5-10.1) 


  


 


 


Total Bilirubin


  0.2 mg/dl


(0.2-1) 


  


 


 


Direct Bilirubin


  < 0.1 mg/dl


(0-0.2) 


  


 


 


Aspartate Amino Transf


(AST/SGOT) 13 U/L (15-37) 


  


  


 


 


Alanine Aminotransferase


(ALT/SGPT) 24 U/L (12-78) 


  


  


 


 


Alkaline Phosphatase


  98 U/L


() 


  


 


 


Total Creatine Kinase


  119 U/L


() 


  


 


 


Total Protein


  8.5 gm/dl


(6.4-8.2) 


  


 


 


Albumin


  3.8 gm/dl


(3.4-5.0) 


  


 


 


Lipase


  151 U/L


() 


  


 


 


Salicylates Level


  6.2 mg/dl


(2.8-20) 


  


 


 


Acetaminophen Level


  < 2 ug/ml


(10-30) 


  


 


 


Ethyl Alcohol mg/dL


  73.0 mg/dl


(0-3) 


  


 


 


Bedside Glucose


  


  90 mg/dl


(70-90) 


 


 


Urine Color   YELLOW 


 


Urine Appearance   CLEAR (CLEAR) 


 


Urine pH   5.5 (4.5-7.5) 


 


Urine Specific Gravity


  


  


  1.015


(1.000-1.030)


 


Urine Protein   NEG (NEG) 


 


Urine Glucose (UA)   NEG (NEG) 


 


Urine Ketones   NEG (NEG) 


 


Urine Occult Blood   NEG (NEG) 


 


Urine Nitrite   NEG (NEG) 


 


Urine Bilirubin   NEG (NEG) 


 


Urine Urobilinogen   NEG (NEG) 


 


Urine Leukocyte Esterase   NEG (NEG) 


 


Urine Pregnancy Test   NEG (NEG) 


 


Urine Opiates Screen   POS (NEG) 


 


Urine Methadone, Qualitative   NEG (NEG) 


 


Urine Barbiturates   NEG (NEG) 


 


Urine Phencyclidine (PCP)


Level 


  


  NEG (NEG) 


 


 


Ur


Amphetamine/Methamphetamine 


  


  NEG (NEG) 


 


 


MDMA (Ecstasy) Screen   POS (NEG) 


 


Urine Benzodiazepines Screen   NEG (NEG) 


 


Urine Cocaine Metabolite   NEG (NEG) 


 


Urine Marijuana (THC)   NEG (NEG) 








Labs reviewed by ED physician.





ECG Per My Interpretation


Indication:  toxicologic


Rate (beats per minute):  114


Rhythm:  sinus tachycardia


Findings:  other (Normal axis, no ST evleation or depression)





ED Course


1709: Past medical records reviewed. The patient was evaluated in room B5. A 

complete history and physical examination was performed.





1946: The psych , and he thinks that the patient is able to go home.





2005: Upon reexamination the patient is doing well. I discussed results and 

treatment plan with the patient. She verbalizes agreement and understanding. 

The patient is ready for discharge.





Medical Decision


Differential diagnosis:


Etiologies such as toxicologic, infection, hypoglycemia, electrolyte 

abnormalities, cardiac sources, intracerebral event, neurologic, as well as 

others were entertained.





This is a 32-year-old female presents emergency department after drinking 

alcohol this evening.  The patient took her normal doses of medications.  She 

is brought here by her mother.  Patient is sad over her marriage.  She is 

willing to speak with case management but denies being suicidal or homicidal.  

Case management felt that the patient was well enough to be discharged home.  

The patient denies being suicidal however I stressed she return to the 

emergency department she develops any suicidal thoughts.  Patient and mother 

were in agreement with the treatment plan.





Medication Reconcilliation


Current Medication List:  was personally reviewed by me





Blood Pressure Screening


Patient's blood pressure:  Normal blood pressure





Impression





 Primary Impression:  


 Mood disorder





Scribe Attestation


The scribe's documentation has been prepared under my direction and personally 

reviewed by me in its entirety. I confirm that the note above accurately 

reflects all work, treatment, procedures, and medical decision making performed 

by me.





Departure Information


Dispostion


Home / Self-Care





Referrals


No Doctor, Assigned (PCP)





Forms


HOME CARE DOCUMENTATION FORM,                                                 

               IMPORTANT VISIT INFORMATION, WORK / SCHOOL INSTRUCTIONS





Patient Instructions


My [facility]





Additional Instructions





Need follow up with outpatient psych services





Return if symptoms worsen





You have been examined and treated today on an emergency basis only. This is 

not a substitute for, or an effort to provide, complete comprehensive medical 

care. It is impossible to recognize and treat all injuries or illnesses in a 

single emergency department visit. It is therefore important that you follow up 

closely with Dr Viveros.  Call as soon as possible for an appointment.  





Thank you for your time and consideration.  I look forward to speaking with you 

again soon.  Please don't hesitate to call us if you have any questions.
Physical Exam:  Gen: NAD, AOx3, non-toxic appearing, able to ambulate without assistance  Head: no scalp abnormalities  Eyes: eyes are aligned, lids, conjunctivae and sclera are normal; pupils are 3mm and equal; brisk response to light; extraocular movements intact  Nose/Sinuses: Nasal mucosa mildly-inflamed, nasal septum midline, no tenderness over maxillary or frontal sinuses  Mouth: Normal lips, tongue, gums and teeth, pharynx is mildly erythematous, tonsils normal sized and without exudates, uvula is midline, oral mucosa dry  Neck: Nontender bilateral tonsilar and anterior cervical nodes, no occipital, auricular, submandibular, submental or supraclavicular nodes, trachea in midline, thyroid lobes not palpable, no neck stiffness or midline spinal tenderness  Lung: CTAB, no respiratory distress, no wheezes/rhonchi/rales B/L, speaking in full sentences, coughing+  CV: RRR, no murmurs, rubs or gallops  Abd: soft, NTND, no guarding, no rigidity, no CVA tenderness   MSK: no visible deformities, ROM normal in UE/LE, no back pain  Neuro: No focal sensory or motor deficits  Skin: Warm, well perfused, no rash  Psych: normal affect, calm  ~Guillermo Slater D.O. -Resident

## 2022-03-11 NOTE — DIAGNOSTIC IMAGING REPORT
CHEST ONE VIEW PORTABLE



HISTORY:  32 years-old Female cough acute cough



COMPARISON: Chest radiograph 2/23/2015



TECHNIQUE: Portable AP view of the chest



FINDINGS: 

Cardiomediastinal and hilar silhouettes are within normal limits. No

pneumothorax, pleural effusion, focal airspace consolidation or overt pulmonary

edema. Bones of the chest are grossly intact.



IMPRESSION: No acute cardiopulmonary process. 







The above report was generated using voice recognition software. It may contain

grammatical, syntax or spelling errors.







Electronically signed by:  Yong Castaneda M.D.

12/10/2017 9:20 AM



Dictated Date/Time:  12/10/2017 9:15 AM
98.9